# Patient Record
Sex: FEMALE | Race: WHITE | NOT HISPANIC OR LATINO | Employment: UNEMPLOYED | URBAN - METROPOLITAN AREA
[De-identification: names, ages, dates, MRNs, and addresses within clinical notes are randomized per-mention and may not be internally consistent; named-entity substitution may affect disease eponyms.]

---

## 2021-01-01 ENCOUNTER — HOSPITAL ENCOUNTER (EMERGENCY)
Facility: HOSPITAL | Age: 31
Discharge: HOME | End: 2021-01-01
Attending: EMERGENCY MEDICINE | Admitting: EMERGENCY MEDICINE
Payer: COMMERCIAL

## 2021-01-01 VITALS
RESPIRATION RATE: 18 BRPM | DIASTOLIC BLOOD PRESSURE: 83 MMHG | HEIGHT: 65 IN | TEMPERATURE: 98.8 F | SYSTOLIC BLOOD PRESSURE: 141 MMHG | WEIGHT: 131.17 LBS | HEART RATE: 122 BPM | BODY MASS INDEX: 21.85 KG/M2 | OXYGEN SATURATION: 97 %

## 2021-01-01 DIAGNOSIS — F10.20 ALCOHOLISM (HCC): ICD-10-CM

## 2021-01-01 DIAGNOSIS — F10.920 ALCOHOLIC INTOXICATION WITHOUT COMPLICATION (HCC): Primary | ICD-10-CM

## 2021-01-01 LAB
ALBUMIN SERPL BCP-MCNC: 3.7 G/DL (ref 3.5–5)
ALP SERPL-CCNC: 82 U/L (ref 46–116)
ALT SERPL W P-5'-P-CCNC: 25 U/L (ref 12–78)
AMPHETAMINES SERPL QL SCN: NEGATIVE
ANION GAP SERPL CALCULATED.3IONS-SCNC: 15 MMOL/L (ref 4–13)
AST SERPL W P-5'-P-CCNC: 43 U/L (ref 5–45)
BACTERIA UR QL AUTO: ABNORMAL /HPF
BARBITURATES UR QL: NEGATIVE
BASE EX.OXY STD BLDV CALC-SCNC: 96.8 % (ref 60–80)
BASE EXCESS BLDV CALC-SCNC: -6.2 MMOL/L
BASOPHILS # BLD AUTO: 0.02 THOUSANDS/ΜL (ref 0–0.1)
BASOPHILS NFR BLD AUTO: 0 % (ref 0–1)
BENZODIAZ UR QL: NEGATIVE
BILIRUB SERPL-MCNC: 0.43 MG/DL (ref 0.2–1)
BILIRUB UR QL STRIP: NEGATIVE
BUN SERPL-MCNC: 12 MG/DL (ref 5–25)
CALCIUM SERPL-MCNC: 8.4 MG/DL (ref 8.3–10.1)
CHLORIDE SERPL-SCNC: 98 MMOL/L (ref 100–108)
CLARITY UR: CLEAR
CO2 SERPL-SCNC: 19 MMOL/L (ref 21–32)
COCAINE UR QL: NEGATIVE
COLOR UR: YELLOW
CREAT SERPL-MCNC: 0.88 MG/DL (ref 0.6–1.3)
EOSINOPHIL # BLD AUTO: 0 THOUSAND/ΜL (ref 0–0.61)
EOSINOPHIL NFR BLD AUTO: 0 % (ref 0–6)
ERYTHROCYTE [DISTWIDTH] IN BLOOD BY AUTOMATED COUNT: 17.7 % (ref 11.6–15.1)
ETHANOL SERPL-MCNC: 244 MG/DL (ref 0–3)
GFR SERPL CREATININE-BSD FRML MDRD: 89 ML/MIN/1.73SQ M
GLUCOSE SERPL-MCNC: 111 MG/DL (ref 65–140)
GLUCOSE UR STRIP-MCNC: NEGATIVE MG/DL
HCG SERPL QL: NEGATIVE
HCO3 BLDV-SCNC: 16.4 MMOL/L (ref 24–30)
HCT VFR BLD AUTO: 39.4 % (ref 34.8–46.1)
HGB BLD-MCNC: 13 G/DL (ref 11.5–15.4)
HGB UR QL STRIP.AUTO: ABNORMAL
IMM GRANULOCYTES # BLD AUTO: 0.01 THOUSAND/UL (ref 0–0.2)
IMM GRANULOCYTES NFR BLD AUTO: 0 % (ref 0–2)
KETONES UR STRIP-MCNC: NEGATIVE MG/DL
LEUKOCYTE ESTERASE UR QL STRIP: NEGATIVE
LYMPHOCYTES # BLD AUTO: 0.62 THOUSANDS/ΜL (ref 0.6–4.47)
LYMPHOCYTES NFR BLD AUTO: 12 % (ref 14–44)
MAGNESIUM SERPL-MCNC: 1.9 MG/DL (ref 1.6–2.6)
MCH RBC QN AUTO: 29 PG (ref 26.8–34.3)
MCHC RBC AUTO-ENTMCNC: 33 G/DL (ref 31.4–37.4)
MCV RBC AUTO: 88 FL (ref 82–98)
METHADONE UR QL: NEGATIVE
MONOCYTES # BLD AUTO: 0.19 THOUSAND/ΜL (ref 0.17–1.22)
MONOCYTES NFR BLD AUTO: 4 % (ref 4–12)
NEUTROPHILS # BLD AUTO: 4.14 THOUSANDS/ΜL (ref 1.85–7.62)
NEUTS SEG NFR BLD AUTO: 84 % (ref 43–75)
NITRITE UR QL STRIP: NEGATIVE
NON-SQ EPI CELLS URNS QL MICRO: ABNORMAL /HPF
NRBC BLD AUTO-RTO: 0 /100 WBCS
O2 CT BLDV-SCNC: 18.6 ML/DL
OPIATES UR QL SCN: NEGATIVE
OXYCODONE+OXYMORPHONE UR QL SCN: NEGATIVE
PCO2 BLDV: 25.2 MM HG (ref 42–50)
PCP UR QL: NEGATIVE
PH BLDV: 7.43 [PH] (ref 7.3–7.4)
PH UR STRIP.AUTO: 6 [PH]
PLATELET # BLD AUTO: 162 THOUSANDS/UL (ref 149–390)
PMV BLD AUTO: 10.4 FL (ref 8.9–12.7)
PO2 BLDV: 168.1 MM HG (ref 35–45)
POTASSIUM SERPL-SCNC: 3.9 MMOL/L (ref 3.5–5.3)
PROT SERPL-MCNC: 8.1 G/DL (ref 6.4–8.2)
PROT UR STRIP-MCNC: ABNORMAL MG/DL
RBC # BLD AUTO: 4.48 MILLION/UL (ref 3.81–5.12)
RBC #/AREA URNS AUTO: ABNORMAL /HPF
SODIUM SERPL-SCNC: 132 MMOL/L (ref 136–145)
SP GR UR STRIP.AUTO: >=1.03 (ref 1–1.03)
THC UR QL: POSITIVE
UROBILINOGEN UR QL STRIP.AUTO: 0.2 E.U./DL
WBC # BLD AUTO: 4.98 THOUSAND/UL (ref 4.31–10.16)
WBC #/AREA URNS AUTO: ABNORMAL /HPF

## 2021-01-01 PROCEDURE — 99285 EMERGENCY DEPT VISIT HI MDM: CPT | Performed by: EMERGENCY MEDICINE

## 2021-01-01 PROCEDURE — 96365 THER/PROPH/DIAG IV INF INIT: CPT

## 2021-01-01 PROCEDURE — 84703 CHORIONIC GONADOTROPIN ASSAY: CPT | Performed by: EMERGENCY MEDICINE

## 2021-01-01 PROCEDURE — 83735 ASSAY OF MAGNESIUM: CPT | Performed by: EMERGENCY MEDICINE

## 2021-01-01 PROCEDURE — 85025 COMPLETE CBC W/AUTO DIFF WBC: CPT | Performed by: EMERGENCY MEDICINE

## 2021-01-01 PROCEDURE — 96375 TX/PRO/DX INJ NEW DRUG ADDON: CPT

## 2021-01-01 PROCEDURE — 80053 COMPREHEN METABOLIC PANEL: CPT | Performed by: EMERGENCY MEDICINE

## 2021-01-01 PROCEDURE — 96361 HYDRATE IV INFUSION ADD-ON: CPT

## 2021-01-01 PROCEDURE — 36415 COLL VENOUS BLD VENIPUNCTURE: CPT | Performed by: EMERGENCY MEDICINE

## 2021-01-01 PROCEDURE — 99285 EMERGENCY DEPT VISIT HI MDM: CPT

## 2021-01-01 PROCEDURE — 82805 BLOOD GASES W/O2 SATURATION: CPT | Performed by: EMERGENCY MEDICINE

## 2021-01-01 PROCEDURE — 82077 ASSAY SPEC XCP UR&BREATH IA: CPT | Performed by: EMERGENCY MEDICINE

## 2021-01-01 PROCEDURE — 80307 DRUG TEST PRSMV CHEM ANLYZR: CPT | Performed by: EMERGENCY MEDICINE

## 2021-01-01 PROCEDURE — 96376 TX/PRO/DX INJ SAME DRUG ADON: CPT

## 2021-01-01 PROCEDURE — 81001 URINALYSIS AUTO W/SCOPE: CPT | Performed by: EMERGENCY MEDICINE

## 2021-01-01 PROCEDURE — 96366 THER/PROPH/DIAG IV INF ADDON: CPT

## 2021-01-01 PROCEDURE — 93005 ELECTROCARDIOGRAM TRACING: CPT

## 2021-01-01 RX ORDER — CHLORDIAZEPOXIDE HYDROCHLORIDE 25 MG/1
25 CAPSULE, GELATIN COATED ORAL 3 TIMES DAILY PRN
Qty: 20 CAPSULE | Refills: 0 | Status: SHIPPED | OUTPATIENT
Start: 2021-01-01 | End: 2021-01-03 | Stop reason: HOSPADM

## 2021-01-01 RX ORDER — ONDANSETRON 2 MG/ML
4 INJECTION INTRAMUSCULAR; INTRAVENOUS ONCE
Status: COMPLETED | OUTPATIENT
Start: 2021-01-01 | End: 2021-01-01

## 2021-01-01 RX ORDER — SODIUM CHLORIDE, SODIUM LACTATE, POTASSIUM CHLORIDE, CALCIUM CHLORIDE 600; 310; 30; 20 MG/100ML; MG/100ML; MG/100ML; MG/100ML
1000 INJECTION, SOLUTION INTRAVENOUS CONTINUOUS
Status: DISCONTINUED | OUTPATIENT
Start: 2021-01-01 | End: 2021-01-01 | Stop reason: HOSPADM

## 2021-01-01 RX ORDER — DIAZEPAM 5 MG/ML
10 INJECTION, SOLUTION INTRAMUSCULAR; INTRAVENOUS ONCE
Status: COMPLETED | OUTPATIENT
Start: 2021-01-01 | End: 2021-01-01

## 2021-01-01 RX ORDER — MAGNESIUM SULFATE HEPTAHYDRATE 40 MG/ML
2 INJECTION, SOLUTION INTRAVENOUS ONCE
Status: COMPLETED | OUTPATIENT
Start: 2021-01-01 | End: 2021-01-01

## 2021-01-01 RX ORDER — ONDANSETRON 4 MG/1
8 TABLET, ORALLY DISINTEGRATING ORAL EVERY 8 HOURS PRN
Qty: 20 TABLET | Refills: 3 | Status: SHIPPED | OUTPATIENT
Start: 2021-01-01

## 2021-01-01 RX ORDER — BUSPIRONE HYDROCHLORIDE 15 MG/1
15 TABLET ORAL 2 TIMES DAILY
COMMUNITY

## 2021-01-01 RX ORDER — LORAZEPAM 2 MG/ML
1 INJECTION INTRAMUSCULAR ONCE
Status: COMPLETED | OUTPATIENT
Start: 2021-01-01 | End: 2021-01-01

## 2021-01-01 RX ADMIN — MAGNESIUM SULFATE HEPTAHYDRATE 2 G: 40 INJECTION, SOLUTION INTRAVENOUS at 15:34

## 2021-01-01 RX ADMIN — LORAZEPAM 1 MG: 2 INJECTION INTRAMUSCULAR; INTRAVENOUS at 18:06

## 2021-01-01 RX ADMIN — DIAZEPAM 10 MG: 10 INJECTION, SOLUTION INTRAMUSCULAR; INTRAVENOUS at 15:33

## 2021-01-01 RX ADMIN — ONDANSETRON 4 MG: 2 INJECTION INTRAMUSCULAR; INTRAVENOUS at 15:31

## 2021-01-01 RX ADMIN — SODIUM CHLORIDE, SODIUM LACTATE, POTASSIUM CHLORIDE, AND CALCIUM CHLORIDE 1000 ML: .6; .31; .03; .02 INJECTION, SOLUTION INTRAVENOUS at 17:17

## 2021-01-01 RX ADMIN — DIAZEPAM 10 MG: 10 INJECTION, SOLUTION INTRAMUSCULAR; INTRAVENOUS at 16:19

## 2021-01-01 NOTE — DISCHARGE INSTRUCTIONS
Diagnosis; alcoholism/ acute alcohol intoxication     -  naltrexone-- - 1 tablet once a day - helps prevent alcohol cravings     - please keep hydrated- and eat food     -  for nausea/ vomiting - zofran -  2 tablets dissolve in the mouth  every 6-8 hrs as needed     - for any signs of withdrawal- shakes-- librium 1-2 tablets every 6-8 hrs as needed     - please return to  the er for any worsening alcohol withdrawal symptoms- any seizure activity -- any intractable vomiting with the inability to keep anything down by mouth /any increasing bloody/coffee ground vomitus/ any black tarry stools-

## 2021-01-01 NOTE — ED NOTES
Per Dr Robbie Snyder, patient can be discharged after receiving liter of fluids     [facility], RN  01/01/21 6866

## 2021-01-01 NOTE — ED NOTES
Patient states that she is not feeling well  MD made aware  Orders placed   Patient has 500 ml of fluids remaining     [facility]  01/01/21 8405

## 2021-01-01 NOTE — ED PROCEDURE NOTE
PROCEDURE  ECG 12 Lead Documentation Only    Date/Time: 1/1/2021 4:11 PM  Performed by: Deniz Montalvo MD  Authorized by: Deniz Montalvo MD     Indications / Diagnosis:  TACHY  ECG reviewed by me, the ED Provider: yes    Patient location:  ED and bedside  Previous ECG:     Previous ECG:  Unavailable    Comparison to cardiac monitor: Yes    Interpretation:     Interpretation: non-specific    Rate:     ECG rate:  109    ECG rate assessment: tachycardic    Rhythm:     Rhythm: sinus tachycardia    Ectopy:     Ectopy: PVCs      PVCs:  Infrequent and unifocal  QRS:     QRS axis:  Normal    QRS intervals:  Normal  Conduction:     Conduction: abnormal      Abnormal conduction comment:  SHORT [place]   ST segments:     ST segments:  Normal  T waves:     T waves: flattening and inverted      Flattening:  AVL and V2    Inverted:  V1  Other findings:     Other findings: U wave    Comments:      NO ECG SIGNS OF ISCHEMIA/ INJURY / R HEART STRAIN / Gwendolyn Rowe MD  01/01/21 6994

## 2021-01-02 ENCOUNTER — APPOINTMENT (EMERGENCY)
Dept: RADIOLOGY | Facility: HOSPITAL | Age: 31
End: 2021-01-02
Payer: COMMERCIAL

## 2021-01-02 ENCOUNTER — HOSPITAL ENCOUNTER (OUTPATIENT)
Facility: HOSPITAL | Age: 31
Setting detail: OBSERVATION
Discharge: HOME | End: 2021-01-03
Attending: EMERGENCY MEDICINE | Admitting: INTERNAL MEDICINE
Payer: COMMERCIAL
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
## 2021-01-02 DIAGNOSIS — Z72.89 ALCOHOL USE: ICD-10-CM

## 2021-01-02 PROBLEM — F10.929 ALCOHOL INTOXICATION (HCC): Status: ACTIVE | Noted: 2021-01-02

## 2021-01-02 PROBLEM — F41.9 ANXIETY: Status: ACTIVE | Noted: 2021-01-02

## 2021-01-02 LAB
AMPHETAMINES SERPL QL SCN: NEGATIVE
ATRIAL RATE: 114 BPM
BACTERIA UR QL AUTO: NORMAL /HPF
BARBITURATES UR QL: NEGATIVE
BENZODIAZ UR QL: NEGATIVE
BILIRUB UR QL STRIP: NEGATIVE
CLARITY UR: CLEAR
COCAINE UR QL: NEGATIVE
COLOR UR: YELLOW
COLOR, POC: YELLOW
ETHANOL EXG-MCNC: 0.35 MG/DL
EXT PREG TEST URINE: NEGATIVE
EXT. CONTROL ED NAV: NORMAL
GLUCOSE UR STRIP-MCNC: NEGATIVE MG/DL
HGB UR QL STRIP.AUTO: ABNORMAL
KETONES UR STRIP-MCNC: NEGATIVE MG/DL
LEUKOCYTE ESTERASE UR QL STRIP: NEGATIVE
METHADONE UR QL: NEGATIVE
NITRITE UR QL STRIP: NEGATIVE
NON-SQ EPI CELLS URNS QL MICRO: NORMAL /HPF
OPIATES UR QL SCN: NEGATIVE
OXYCODONE+OXYMORPHONE UR QL SCN: NEGATIVE
P AXIS: 69 DEGREES
PCP UR QL: NEGATIVE
PH UR STRIP.AUTO: 6.5 [PH] (ref 4.5–8)
PR INTERVAL: 118 MS
PROT UR STRIP-MCNC: NEGATIVE MG/DL
QRS AXIS: 36 DEGREES
QRSD INTERVAL: 70 MS
QT INTERVAL: 328 MS
QTC INTERVAL: 442 MS
RBC #/AREA URNS AUTO: NORMAL /HPF
SP GR UR STRIP.AUTO: 1.01 (ref 1–1.03)
T WAVE AXIS: 65 DEGREES
THC UR QL: POSITIVE
UROBILINOGEN UR QL STRIP.AUTO: 0.2 E.U./DL
VENTRICULAR RATE: 109 BPM
WBC #/AREA URNS AUTO: NORMAL /HPF

## 2021-01-02 PROCEDURE — 93010 ELECTROCARDIOGRAM REPORT: CPT | Performed by: INTERNAL MEDICINE

## 2021-01-02 PROCEDURE — 82075 ASSAY OF BREATH ETHANOL: CPT | Performed by: EMERGENCY MEDICINE

## 2021-01-02 PROCEDURE — 81025 URINE PREGNANCY TEST: CPT | Performed by: EMERGENCY MEDICINE

## 2021-01-02 PROCEDURE — 70450 CT HEAD/BRAIN W/O DYE: CPT

## 2021-01-02 PROCEDURE — 99285 EMERGENCY DEPT VISIT HI MDM: CPT

## 2021-01-02 PROCEDURE — 99285 EMERGENCY DEPT VISIT HI MDM: CPT | Performed by: EMERGENCY MEDICINE

## 2021-01-02 PROCEDURE — 80307 DRUG TEST PRSMV CHEM ANLYZR: CPT | Performed by: EMERGENCY MEDICINE

## 2021-01-02 PROCEDURE — 81001 URINALYSIS AUTO W/SCOPE: CPT

## 2021-01-02 PROCEDURE — G1004 CDSM NDSC: HCPCS

## 2021-01-02 RX ORDER — LANOLIN ALCOHOL/MO/W.PET/CERES
6 CREAM (GRAM) TOPICAL
Status: DISCONTINUED | OUTPATIENT
Start: 2021-01-02 | End: 2021-01-03 | Stop reason: HOSPADM

## 2021-01-02 RX ORDER — THIAMINE MONONITRATE (VIT B1) 100 MG
100 TABLET ORAL DAILY
Status: DISCONTINUED | OUTPATIENT
Start: 2021-01-02 | End: 2021-01-03 | Stop reason: HOSPADM

## 2021-01-02 RX ORDER — LANOLIN ALCOHOL/MO/W.PET/CERES
400 CREAM (GRAM) TOPICAL DAILY
Status: DISCONTINUED | OUTPATIENT
Start: 2021-01-02 | End: 2021-01-03 | Stop reason: HOSPADM

## 2021-01-02 RX ORDER — LORAZEPAM 1 MG/1
2 TABLET ORAL ONCE
Status: COMPLETED | OUTPATIENT
Start: 2021-01-02 | End: 2021-01-02

## 2021-01-02 RX ORDER — SODIUM CHLORIDE, SODIUM GLUCONATE, SODIUM ACETATE, POTASSIUM CHLORIDE, MAGNESIUM CHLORIDE, SODIUM PHOSPHATE, DIBASIC, AND POTASSIUM PHOSPHATE .53; .5; .37; .037; .03; .012; .00082 G/100ML; G/100ML; G/100ML; G/100ML; G/100ML; G/100ML; G/100ML
125 INJECTION, SOLUTION INTRAVENOUS CONTINUOUS
Status: DISCONTINUED | OUTPATIENT
Start: 2021-01-02 | End: 2021-01-03 | Stop reason: HOSPADM

## 2021-01-02 RX ORDER — ONDANSETRON 2 MG/ML
4 INJECTION INTRAMUSCULAR; INTRAVENOUS EVERY 6 HOURS PRN
Status: DISCONTINUED | OUTPATIENT
Start: 2021-01-02 | End: 2021-01-03 | Stop reason: HOSPADM

## 2021-01-02 RX ADMIN — MELATONIN 6 MG: at 22:51

## 2021-01-02 RX ADMIN — SODIUM CHLORIDE, SODIUM GLUCONATE, SODIUM ACETATE, POTASSIUM CHLORIDE, MAGNESIUM CHLORIDE, SODIUM PHOSPHATE, DIBASIC, AND POTASSIUM PHOSPHATE 125 ML/HR: .53; .5; .37; .037; .03; .012; .00082 INJECTION, SOLUTION INTRAVENOUS at 23:03

## 2021-01-02 RX ADMIN — BUSPIRONE HYDROCHLORIDE 15 MG: 10 TABLET ORAL at 22:31

## 2021-01-02 RX ADMIN — LORAZEPAM 2 MG: 1 TABLET ORAL at 22:51

## 2021-01-02 RX ADMIN — ONDANSETRON 4 MG: 2 INJECTION INTRAMUSCULAR; INTRAVENOUS at 22:51
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
## 2021-01-03 VITALS
OXYGEN SATURATION: 95 % | RESPIRATION RATE: 18 BRPM | WEIGHT: 131 LBS | HEIGHT: 65 IN | BODY MASS INDEX: 21.83 KG/M2 | SYSTOLIC BLOOD PRESSURE: 137 MMHG | DIASTOLIC BLOOD PRESSURE: 85 MMHG | HEART RATE: 73 BPM | TEMPERATURE: 98.4 F

## 2021-01-03 LAB
ALBUMIN SERPL BCP-MCNC: 3.6 G/DL (ref 3.5–5)
ALP SERPL-CCNC: 76 U/L (ref 46–116)
ALT SERPL W P-5'-P-CCNC: 24 U/L (ref 12–78)
ANION GAP SERPL CALCULATED.3IONS-SCNC: 6 MMOL/L (ref 4–13)
AST SERPL W P-5'-P-CCNC: 52 U/L (ref 5–45)
BASOPHILS # BLD AUTO: 0.01 THOUSANDS/ΜL (ref 0–0.1)
BASOPHILS NFR BLD AUTO: 0 % (ref 0–1)
BILIRUB SERPL-MCNC: 0.84 MG/DL (ref 0.2–1)
BUN SERPL-MCNC: 7 MG/DL (ref 5–25)
CALCIUM SERPL-MCNC: 8.5 MG/DL (ref 8.3–10.1)
CHLORIDE SERPL-SCNC: 103 MMOL/L (ref 100–108)
CO2 SERPL-SCNC: 26 MMOL/L (ref 21–32)
CREAT SERPL-MCNC: 0.67 MG/DL (ref 0.6–1.3)
EOSINOPHIL # BLD AUTO: 0.01 THOUSAND/ΜL (ref 0–0.61)
EOSINOPHIL NFR BLD AUTO: 0 % (ref 0–6)
ERYTHROCYTE [DISTWIDTH] IN BLOOD BY AUTOMATED COUNT: 17.5 % (ref 11.6–15.1)
GFR SERPL CREATININE-BSD FRML MDRD: 119 ML/MIN/1.73SQ M
GLUCOSE P FAST SERPL-MCNC: 88 MG/DL (ref 65–99)
GLUCOSE SERPL-MCNC: 88 MG/DL (ref 65–140)
HCT VFR BLD AUTO: 34.7 % (ref 34.8–46.1)
HGB BLD-MCNC: 11 G/DL (ref 11.5–15.4)
IMM GRANULOCYTES # BLD AUTO: 0.01 THOUSAND/UL (ref 0–0.2)
IMM GRANULOCYTES NFR BLD AUTO: 0 % (ref 0–2)
LYMPHOCYTES # BLD AUTO: 1.29 THOUSANDS/ΜL (ref 0.6–4.47)
LYMPHOCYTES NFR BLD AUTO: 43 % (ref 14–44)
MCH RBC QN AUTO: 28.9 PG (ref 26.8–34.3)
MCHC RBC AUTO-ENTMCNC: 31.7 G/DL (ref 31.4–37.4)
MCV RBC AUTO: 91 FL (ref 82–98)
MONOCYTES # BLD AUTO: 0.21 THOUSAND/ΜL (ref 0.17–1.22)
MONOCYTES NFR BLD AUTO: 7 % (ref 4–12)
NEUTROPHILS # BLD AUTO: 1.44 THOUSANDS/ΜL (ref 1.85–7.62)
NEUTS SEG NFR BLD AUTO: 50 % (ref 43–75)
NRBC BLD AUTO-RTO: 0 /100 WBCS
PLATELET # BLD AUTO: 92 THOUSANDS/UL (ref 149–390)
PMV BLD AUTO: 12 FL (ref 8.9–12.7)
POTASSIUM SERPL-SCNC: 3.6 MMOL/L (ref 3.5–5.3)
PROT SERPL-MCNC: 7.3 G/DL (ref 6.4–8.2)
RBC # BLD AUTO: 3.81 MILLION/UL (ref 3.81–5.12)
SODIUM SERPL-SCNC: 135 MMOL/L (ref 136–145)
WBC # BLD AUTO: 2.97 THOUSAND/UL (ref 4.31–10.16)

## 2021-01-03 PROCEDURE — 80053 COMPREHEN METABOLIC PANEL: CPT | Performed by: STUDENT IN AN ORGANIZED HEALTH CARE EDUCATION/TRAINING PROGRAM

## 2021-01-03 PROCEDURE — 85025 COMPLETE CBC W/AUTO DIFF WBC: CPT | Performed by: STUDENT IN AN ORGANIZED HEALTH CARE EDUCATION/TRAINING PROGRAM

## 2021-01-03 PROCEDURE — NC001 PR NO CHARGE: Performed by: INTERNAL MEDICINE

## 2021-01-03 PROCEDURE — 99234 HOSP IP/OBS SM DT SF/LOW 45: CPT | Performed by: INTERNAL MEDICINE

## 2021-01-03 RX ORDER — CHLORDIAZEPOXIDE HYDROCHLORIDE 25 MG/1
25 CAPSULE, GELATIN COATED ORAL ONCE
Status: COMPLETED | OUTPATIENT
Start: 2021-01-03 | End: 2021-01-03

## 2021-01-03 RX ORDER — LORAZEPAM 1 MG/1
2 TABLET ORAL ONCE
Status: COMPLETED | OUTPATIENT
Start: 2021-01-03 | End: 2021-01-03

## 2021-01-03 RX ORDER — CHLORDIAZEPOXIDE HYDROCHLORIDE 25 MG/1
25 CAPSULE, GELATIN COATED ORAL 3 TIMES DAILY PRN
Refills: 0 | Status: CANCELLED | OUTPATIENT
Start: 2021-01-03 | End: 2021-01-13

## 2021-01-03 RX ADMIN — CHLORDIAZEPOXIDE HYDROCHLORIDE 25 MG: 25 CAPSULE ORAL at 10:45

## 2021-01-03 RX ADMIN — THIAMINE HCL TAB 100 MG 100 MG: 100 TAB at 09:33

## 2021-01-03 RX ADMIN — LORAZEPAM 2 MG: 1 TABLET ORAL at 09:32

## 2021-01-03 RX ADMIN — LORAZEPAM 2 MG: 1 TABLET ORAL at 04:12

## 2021-01-03 RX ADMIN — FOLIC ACID TAB 400 MCG 400 MCG: 400 TAB at 09:33

## 2021-01-03 RX ADMIN — THIAMINE HCL TAB 100 MG 100 MG: 100 TAB at 00:01

## 2021-01-03 RX ADMIN — BUSPIRONE HYDROCHLORIDE 15 MG: 10 TABLET ORAL at 09:33

## 2021-01-03 RX ADMIN — SODIUM CHLORIDE, SODIUM GLUCONATE, SODIUM ACETATE, POTASSIUM CHLORIDE, MAGNESIUM CHLORIDE, SODIUM PHOSPHATE, DIBASIC, AND POTASSIUM PHOSPHATE 125 ML/HR: .53; .5; .37; .037; .03; .012; .00082 INJECTION, SOLUTION INTRAVENOUS at 05:52

## 2021-01-03 NOTE — DISCHARGE SUMMARY
INTERNAL MEDICINE RESIDENCY DISCHARGE SUMMARY     Nichelle Ackerman   35 y o  female  MRN: [id number]  Room/Bed: [facility] 91/[facility]     [address] [address]   Encounter: [id number]    Principal Problem:    Alcohol intoxication [facility])  Active Problems:    Anxiety      Anxiety  Assessment & Plan  Continue home buspar 15mg BID  Melatonin 6mg qHS for insomnia    * Alcohol intoxication [facility])  Assessment & Plan  Patient has history of alcohol withdrawal seizures  Presented to ED today after a fall and blood alcohol level found to be 0 354  Patient alert and oriented x4  CT head negative for acute intracranial abnormalities  Patient appears somewhat anxious, tachycardic to 120, tremors  Patient is prescribed Librium outpatient for withdrawals  Patient has plane ticket to return to [place] tomorrow and would like to go back to rehab there  No Si/HI/visual or auditory hallucinations    Plan:  - CIWA protocol   - isolyte 125 mL/hr  - thiamine, folate  - Zofran PRN for nausea  - regular diet   - continue to monitor hemodynamic status         [address]     Per H&P 1/3/2020:  "Patient is a 55-year-old female past medical history significant for alcohol abuse, anxiety  Patient presented to ED status post fall in the setting of alcohol intoxication  Reportedly patient was found at the bottom of the steps  Patient able to recall details of her fall  Patient has extensive history of alcohol abuse and status post rehab in [place]  Patient reports severe life stressors that allegedly culture to start drinking again  A time of evaluation, patient noted to be tremulous and reported palpitations  Denies hallucinations, SI, HI  Vitals notable for tachycardia in the 110s and documented respiratory rate in 20s  Blood pressure within normal limits  Room air  Laboratory evaluation notable for sodium 130 to  Normal kidney function markers  Elevated LUBNA at 244  UDS positive for THC  CT head   Patient admitted for alcohol intoxication and at risk of alcohol withdrawal on CIWA protocol "    Overnight, she was tachycardic and agitated requiring prn ativan x 2  She remained hemodynamically stable  CIWA score 2 this morning  CBC: WBC 2 97  CMP Na 135  She has a flight back to [place] at 3 pm and will go back to the rehab center  She is comfortable going home with Librium and follow up at the rehab center  She was advised to seek medical care if she feels symptoms of withdrawal including worsening agitations, elevated bp, confusion, tremors and seizures  The patient was instructed to follow-up with PCP within 1 week  The patient was counseled on the importance of taking all medications as prescribed  Strict return instructions given  The patient verbalized understanding of hospital course and importance of making all follow-up appointments  All questions answered  Subjective:     Patient seen and examined at bedside  She endorses agitation for which she did require Ativan  She still feels anxious  She denies any hallucinations, chest pain, shortness of breath, nausea, vomiting, fever-chills  She has a flight at 3:00 p m  And would like to be discharged     Objective:    Vitals:    01/03/21 1007   BP: 137/85   Pulse: 73   Resp:    Temp: 98 4 °F (36 9 °C)   SpO2: 95%        Physical Exam  Constitutional:       General: She is not in acute distress  Appearance: She is normal weight  She is not ill-appearing, toxic-appearing or diaphoretic  HENT:      Head: Normocephalic and atraumatic  Cardiovascular:      Rate and Rhythm: Normal rate and regular rhythm  Pulses: Normal pulses  Heart sounds: Normal heart sounds  No murmur  No friction rub  No gallop  Pulmonary:      Effort: Pulmonary effort is normal  No respiratory distress  Breath sounds: Normal breath sounds  No stridor  No wheezing, rhonchi or rales     Chest:      Chest wall: No tenderness  Abdominal:      General: Abdomen is flat  Bowel sounds are normal  There is no distension  Palpations: Abdomen is soft  There is no mass  Tenderness: There is no abdominal tenderness  There is no right CVA tenderness, left CVA tenderness, guarding or rebound  Hernia: No hernia is present  Musculoskeletal:      Right lower leg: No edema  Left lower leg: No edema  Neurological:      General: No focal deficit present  Mental Status: She is alert and oriented to person, place, and time  Mental status is at baseline  Psychiatric:         Mood and Affect: Mood normal          Behavior: Behavior normal               DISCHARGE INFORMATION     PCP at Discharge: N/A  Admitting Provider: Reggie Tomas MD  Admission Date: 1/2/2021    Discharge Provider: Reggie Tomas MD  Discharge Date: 1/3/2021    Discharge Disposition: Home/Self Care  Discharge Condition: good  Discharge with Lines: no    Discharge Diet: regular diet  Activity Restrictions: as tolerated  Test Results Pending at Discharge: beto    Discharge Diagnoses:  Principal Problem:    Alcohol intoxication ([address] )  Active Problems:    Anxiety  Resolved Problems:    * No resolved hospital problems  *      Consulting Providers:      Diagnostic & Therapeutic Procedures Performed:  Ct Head Without Contrast    Result Date: 1/2/2021  Impression: No acute intracranial abnormality   Workstation performed: [id number]       Code Status: Level 1 - Full Code  Advance Directive & Living Will: <no information>  Power of :    POLST:      Medications:  Current Discharge Medication List        Current Discharge Medication List        Current Discharge Medication List      CONTINUE these medications which have NOT CHANGED    Details   busPIRone (BUSPAR) 15 mg tablet Take 15 mg by mouth 2 (two) times a day      chlordiazePOXIDE (LIBRIUM) 25 mg capsule Take 1 capsule (25 mg total) by mouth 3 (three) times a day as needed for anxiety for up to 20 doses  Qty: 20 capsule, Refills: 0    Associated Diagnoses: Alcoholic intoxication without complication (HCC)      ondansetron (Zofran ODT) 4 mg disintegrating tablet Take 2 tablets (8 mg total) by mouth every 8 (eight) hours as needed for vomiting for up to 20 doses  Qty: 20 tablet, Refills: 3    Associated Diagnoses: Alcoholic intoxication without complication (HCC)             Allergies: Allergies   Allergen Reactions    Lamictal [Lamotrigine] Blisters       FOLLOW-UP     PCP Outpatient Follow-up:  yes      Follow up: PCP  Date and time: 1 week    Consulting Providers Follow-up:  none required     Active Issues Requiring Follow-up:   none    Discharge Statement:   I spent 30 minutes minutes discharging the patient  This time was spent on the day of discharge  I had direct contact with the patient on the day of discharge  Additional documentation is required if more than 30 minutes were spent on discharge  Portions of the record may have been created with voice recognition software  Occasional wrong word or "sound a like" substitutions may have occurred due to the inherent limitations of voice recognition software    Read the chart carefully and recognize, using context, where substitutions have occurred     ==  Anna Jones MD  [address]  Internal Medicine Resident PGY-1

## 2021-01-03 NOTE — ASSESSMENT & PLAN NOTE
Patient has history of alcohol withdrawal seizures  Presented to ED today after a fall and blood alcohol level found to be 0 354  Patient alert and oriented x4  CT head negative for acute intracranial abnormalities  Patient appears somewhat anxious, tachycardic to 120, tremors  Patient is prescribed Librium outpatient for withdrawals  Patient has plane ticket to return to [place] tomorrow and would like to go back to rehab there       No Si/HI/visual or auditory hallucinations    Plan:  - CIWA protocol   - isolyte 125 mL/hr  - thiamine, folate  - Zofran PRN for nausea  - regular diet   - continue to monitor hemodynamic status

## 2021-01-03 NOTE — H&P
INTERNAL MEDICINE RESIDENCY ADMISSION H&P     Name: Juan David Payan   Age & Sex: 35 y o  female   MRN: [id number]  Unit/Bed#: [facility] 919-01   Encounter: [id number]  Primary Care Provider: No primary care provider on file  Code Status: Level 1 - Full Code  Admission Status: INPATIENT   Disposition: Patient requires Med/Surg    Admit to team: SOD Team A    ASSESSMENT/PLAN     Principal Problem:    Alcohol intoxication ([facility] 75 )  Active Problems:    Anxiety      * Alcohol intoxication ([facility] )  Assessment & Plan  Patient has history of alcohol withdrawal seizures  Presented to ED today after a fall and blood alcohol level found to be 0 354  Patient alert and oriented x4  CT head negative for acute intracranial abnormalities  Patient appears somewhat anxious, tachycardic to 120, tremors  Patient is prescribed Librium outpatient for withdrawals  Patient has plane ticket to return to [place] tomorrow and would like to go back to rehab there  No Si/HI/visual or auditory hallucinations    Plan:  - CIWA protocol   - isolyte 125 mL/hr  - thiamine, folate  - Zofran PRN for nausea  - regular diet   - continue to monitor hemodynamic status     Anxiety  Assessment & Plan  Continue home buspar 15mg BID  Melatonin 6mg qHS for insomnia      VTE Pharmacologic Prophylaxis: None indicated  VTE Mechanical Prophylaxis: Ambulate    CHIEF COMPLAINT     Chief Complaint   Patient presents with    Fall     per ems, pt found by police at bottom of stairs unconscious, +etoh, pt a/ox4 during triage, reports depression, denies SI/HI/AH/VH      HISTORY OF PRESENT ILLNESS     Patient is a 61-year-old female with history of alcohol abuse and anxiety who presented to the ED with alcohol intoxication after a fall  Per EMS, patient was found at the bottom of the steps and does not remember that she fell  She was previously in rehab for about a month and [place] and relapsed around mid December after coming to [place] for the holidays  Patient states that the past week she is drink about a handle of liquor a day and today drink a small box of wine  Patient states that her boyfriend is currently in the ICU due to a pretty severe accident which caused her to start drinking again  Prior to rehab, patient states that she drank about a 5th of liquor per day  She does have a history of alcohol withdrawal seizures which were previously treated inpatient  She does not currently have any visual or auditory hallucinations  Currently patient is only experiencing tremors and some palpitations but is otherwise denies fevers, chills, chest pain, shortness of breath, nausea, vomiting, abdominal pain  Denies SI/HI  She has a plane ticket to return to [place] tomorrow and she was planning on going back to rehab  Patient does not smoke cigarettes, uses THC recreationally  No other recreational drug use  In the ED, patient was alert and oriented x4, CBC unremarkable, CMP with sodium 132, CO2 19, AST/ALT within normal limits  Ethanol level 0 354, UDS positive only for THC  Urine positive for moderate blood and no WBC or bacteria  REVIEW OF SYSTEMS     Review of Systems   Constitutional: Negative  HENT: Negative  Eyes: Negative  Respiratory: Negative  Cardiovascular: Positive for palpitations  Gastrointestinal: Negative for diarrhea, nausea and vomiting  Endocrine: Negative  Genitourinary: Negative  Musculoskeletal: Negative  Skin: Negative  Allergic/Immunologic: Negative  Neurological: Positive for tremors  Hematological: Negative  Psychiatric/Behavioral: Negative for self-injury and suicidal ideas  The patient is nervous/anxious        OBJECTIVE     Vitals:    01/02/21 1508 01/02/21 1747 01/02/21 1952   BP: 130/91 119/81 122/81   BP Location: Right arm  Left arm   Pulse: (!) 119 104 95   Resp: 18 16 (!) 24   Temp: 97 6 °F (36 4 °C) 99 1 °F (37 3 °C) 100 °F (37 8 °C)   TempSrc: Oral  Oral   SpO2: 98% 97% 95% Weight: 59 4 kg (131 lb) 59 4 kg (131 lb)    Height: 5' 5" (1 651 m) 5' 5" (1 651 m)       Temperature:   Temp (24hrs), Av 9 °F (37 2 °C), Min:97 6 °F (36 4 °C), Max:100 °F (37 8 °C)    Temperature: 100 °F (37 8 °C)  Intake & Output:  I/O        07 -  0700  07 -  0700          Unmeasured Urine Occurrence  1 x        Weights:   IBW: 57 kg    Body mass index is 21 8 kg/m²  Weight (last 2 days)     Date/Time   Weight    21 17:47:41   59 4 (131)    21 1508   59 4 (131)            Physical Exam  Constitutional:       Comments: anxious   HENT:      Head: Normocephalic and atraumatic  Mouth/Throat:      Mouth: Mucous membranes are moist       Pharynx: No oropharyngeal exudate or posterior oropharyngeal erythema  Eyes:      General: No scleral icterus  Extraocular Movements: Extraocular movements intact  Pupils: Pupils are equal, round, and reactive to light  Cardiovascular:      Rate and Rhythm: Regular rhythm  Tachycardia present  Heart sounds: No murmur  No friction rub  No gallop  Pulmonary:      Effort: Pulmonary effort is normal  No respiratory distress  Breath sounds: Normal breath sounds  No wheezing or rales  Abdominal:      General: There is no distension  Palpations: Abdomen is soft  Tenderness: There is no abdominal tenderness  Musculoskeletal: Normal range of motion  Right lower leg: No edema  Left lower leg: No edema  Skin:     General: Skin is warm and dry  Neurological:      General: No focal deficit present  Mental Status: She is alert and oriented to person, place, and time  Cranial Nerves: No cranial nerve deficit  Motor: No weakness     Psychiatric:         Mood and Affect: Mood normal          Behavior: Behavior normal        PAST MEDICAL HISTORY     Past Medical History:   Diagnosis Date    Alcohol abuse      PAST SURGICAL HISTORY     Past Surgical History:   Procedure Laterality Date  BUNIONECTOMY       SOCIAL & FAMILY HISTORY     Social History     Substance and Sexual Activity   Alcohol Use Yes    Frequency: 4 or more times a week     Social History     Substance and Sexual Activity   Drug Use Yes    Types: Marijuana     Social History     Tobacco Use   Smoking Status Never Smoker   Smokeless Tobacco Never Used     History reviewed  No pertinent family history  LABORATORY DATA     Labs: I have personally reviewed pertinent reports  Results from last 7 days   Lab Units 01/01/21  1513   WBC Thousand/uL 4 98   HEMOGLOBIN g/dL 13 0   HEMATOCRIT % 39 4   PLATELETS Thousands/uL 162   NEUTROS PCT % 84*   MONOS PCT % 4      Results from last 7 days   Lab Units 01/01/21  1513   POTASSIUM mmol/L 3 9   CHLORIDE mmol/L 98*   CO2 mmol/L 19*   BUN mg/dL 12   CREATININE mg/dL 0 88   CALCIUM mg/dL 8 4   ALK PHOS U/L 82   ALT U/L 25   AST U/L 43     Results from last 7 days   Lab Units 01/01/21  1513   MAGNESIUM mg/dL 1 9                      Micro:  No results found for: BLOODCX, [employer], WOUNDCULT, SPUTUMCULTUR  IMAGING & DIAGNOSTIC TESTS     Imaging: I have personally reviewed pertinent reports  Ct Head Without Contrast    Result Date: 1/2/2021  Impression: No acute intracranial abnormality  Workstation performed: [id number]     EKG, Pathology, and Other Studies: I have personally reviewed pertinent reports  ALLERGIES     Allergies   Allergen Reactions    Lamictal [Lamotrigine] Blisters     MEDICATIONS PRIOR TO ARRIVAL     Prior to Admission medications    Medication Sig Start Date End Date Taking?  Authorizing Provider   busPIRone (BUSPAR) 15 mg tablet Take 15 mg by mouth 2 (two) times a day   Yes Historical Provider, MD   chlordiazePOXIDE (LIBRIUM) 25 mg capsule Take 1 capsule (25 mg total) by mouth 3 (three) times a day as needed for anxiety for up to 20 doses 1/1/21  Yes Elizabeth Chan MD   ondansetron (Zofran ODT) 4 mg disintegrating tablet Take 2 tablets (8 mg total) by mouth every 8 (eight) hours as needed for vomiting for up to 20 doses 1/1/21  Yes June Post MD     MEDICATIONS ADMINISTERED IN LAST 24 HOURS     CURRENT MEDICATIONS     Current Facility-Administered Medications   Medication Dose Route Frequency Provider Last Rate    busPIRone  15 mg Oral BID Sharda Spicer MD      melatonin  6 mg Oral HS Sharda Spicer MD      multi-electrolyte  125 mL/hr Intravenous Continuous Sharda Spicer MD      ondansetron  4 mg Intravenous Q6H PRN Sharda Spicer MD       multi-electrolyte, 125 mL/hr      ondansetron, 4 mg, Q6H PRN        Admission Time  I spent 20 minutes admitting the patient  This involved direct patient contact where I performed a full history and physical, reviewing previous records, and reviewing laboratory and other diagnostic studies  Portions of the record may have been created with voice recognition software  Occasional wrong word or "sound a like" substitutions may have occurred due to the inherent limitations of voice recognition software    Read the chart carefully and recognize, using context, where substitutions have occurred     ==  Sharda Spicer MD  [address]  Internal Medicine Residency PGY-1

## 2021-01-03 NOTE — PROGRESS NOTES
INTERNAL MEDICINE RESIDENCY SENIOR ADMISSION NOTE     Name: Juan David Payan   Age & Sex: 35 y o  female   MRN: [id number]  Unit/Bed#: [facility] 919-01   Encounter: [id number]  Primary Care Provider: No primary care provider on file  Admit to team: SOD Team A    Code Status: Level 1 - Full Code  Admission Status: OBSERVATION  Disposition: Patient requires Med/Surg  Expected Length of Stay: <2 nights     Principal Problem:    Alcohol intoxication ([address] )  Active Problems:    Anxiety      Patient seen and examined  Reviewed H&P per Dr James Xiong   Agree with the assessment and plan with any exception/addition as noted below:  Patient is a 81-year-old female past medical history significant for alcohol abuse, anxiety  Patient presented to ED status post fall in the setting of alcohol intoxication  Reportedly patient was found at the bottom of the steps  Patient able to recall details of her fall  Patient has extensive history of alcohol abuse and status post rehab in [place]  Patient reports severe life stressors that allegedly culture to start drinking again  A time of evaluation, patient noted to be tremulous and reported palpitations  Denies hallucinations, SI, HI  Vitals notable for tachycardia in the 110s and documented respiratory rate in 20s  Blood pressure within normal limits  Room air  Laboratory evaluation notable for sodium 130 to  Normal kidney function markers  Elevated LUBNA at 244  UDS positive for THC  CT head   Patient admitted for alcohol intoxication and at risk of alcohol withdrawal   Will start CIWA protocol  Will start thiamine and foalte  Will benefit from referral to rehab        Rest of assessment and plan by Dr Suki Waters MD  Internal Medicine Residency, PGY-2  [address] [facility]

## 2021-01-03 NOTE — CASE MANAGEMENT
CM informed by Dr Gina Ascencio, pt medically stable for d/c today  Pt to be d/c and will be taking flight to [facility] today to complete ETOH rehab  Per Dr Denia Pedersen, pt already has accepting rehab facility in [facility] at this time  No other needs identified

## 2021-01-03 NOTE — UTILIZATION REVIEW
Initial Clinical Review    Admission: Date/Time/Statement:   Admission Orders (From admission, onward)     Ordered        01/02/21 1636  Place in Observation  Once                   Orders Placed This Encounter   Procedures    Place in Observation     Standing Status:   Standing     Number of Occurrences:   1     Order Specific Question:   Admitting Physician     Answer:   Jessica Kapadia [[id number]]     Order Specific Question:   Level of Care     Answer:   Med Surg [16]     ED Arrival Information     Expected Arrival Acuity Means of Arrival Escorted By Service Admission Type    - 1/2/2021 15:03 Urgent Ambulance [facility] General Medicine Urgent    Arrival Complaint    fall        Chief Complaint   Patient presents with    Fall     per ems, pt found by police at bottom of stairs unconscious, +etoh, pt a/ox4 during triage, reports depression, denies SI/HI/AH/VH     Assessment/Plan:  34 y/o female admitted observation to M/S/Tele with alcohol intoxication after presenting to ED via EMS after being found at the bottom of the steps  Pt able to recall details of fall  Pt has h/o extensive alcohol abuse and s/p rehab on [place]  Pt states severe life stressors and reason to started drinking again  At time of exam pt tremulous and reported palpitations  LUBNA 244  UDS + THC  CTH no acute findings  Start CIWA scoring for etoh w/d  IVF's  Thiamine, folate  Zofran prn  Regular diet   Psych and case management consulted to assist with possible rehab and d/c plan    CIWA scores: 7--9--5--8--3--8      ED Triage Vitals   Temperature Pulse Respirations Blood Pressure SpO2   01/02/21 1508 01/02/21 1508 01/02/21 1508 01/02/21 1508 01/02/21 1508   97 6 °F (36 4 °C) (!) 119 18 130/91 98 %      Temp Source Heart Rate Source Patient Position - Orthostatic VS BP Location FiO2 (%)   01/02/21 1508 01/02/21 1508 01/02/21 1508 01/02/21 1508 --   Oral Monitor Lying Right arm       Pain Score       01/02/21 1754       5          Wt Readings from Last 1 Encounters:   01/02/21 59 4 kg (131 lb)     Additional Vital Signs:   Date/Time  Temp  Pulse  Resp  BP  MAP (mmHg)  SpO2  O2 Device  Patient Position - Orthostatic VS   01/03/21 09:03:01  98 4 °F (36 9 °C)  85  18  134/83  100  96 %  --  --   01/03/21 05:02:23  99 5 °F (37 5 °C)  97  --  128/86  100  97 %  --  --   01/03/21 04:06:11  99 °F (37 2 °C)  105  --  141/88  106  98 %  --  --   01/03/21 00:01:57  99 8 °F (37 7 °C)  105  20  119/78  92  95 %  --  --   01/02/21 22:38:06  98 8 °F (37 1 °C)  107Abnormal   24Abnormal   124/82  96  95 %  --  --   01/02/21 19:52:20  100 °F (37 8 °C)  95  24Abnormal   122/81  95  95 %  --  Sitting   01/02/21 17:47:41  99 1 °F (37 3 °C)  104  24Abnormal   119/81  94  97 %  --  --   01/02/21 1508  97 6 °F (36 4 °C)  119Abnormal   18  130/91  --  98 %  None (Room air)  Lying       Pertinent Labs/Diagnostic Test Results:   [facility] 1/2 - No acute intracranial abnormality         Results from last 7 days   Lab Units 01/01/21  1513   WBC Thousand/uL 4 98   HEMOGLOBIN g/dL 13 0   HEMATOCRIT % 39 4   PLATELETS Thousands/uL 162   NEUTROS ABS Thousands/µL 4 14     Results from last 7 days   Lab Units 01/03/21  0549 01/01/21  1513   SODIUM mmol/L 135* 132*   POTASSIUM mmol/L 3 6 3 9   CHLORIDE mmol/L 103 98*   CO2 mmol/L 26 19*   ANION GAP mmol/L 6 15*   BUN mg/dL 7 12   CREATININE mg/dL 0 67 0 88   EGFR ml/min/1 73sq m 119 89   CALCIUM mg/dL 8 5 8 4   MAGNESIUM mg/dL  --  1 9     Results from last 7 days   Lab Units 01/03/21  0549 01/01/21  1513   AST U/L 52* 43   ALT U/L 24 25   ALK PHOS U/L 76 82   TOTAL PROTEIN g/dL 7 3 8 1   ALBUMIN g/dL 3 6 3 7   TOTAL BILIRUBIN mg/dL 0 84 0 43     Results from last 7 days   Lab Units 01/03/21  0549 01/01/21  1513   GLUCOSE RANDOM mg/dL 88 111     Results from last 7 days   Lab Units 01/01/21  1513   PH KELLY  7 430*   PCO2 KELLY mm Hg 25 2*   PO2 KELLY mm Hg 168 1*   HCO3 KELLY mmol/L 16 4*   BASE EXC KELLY mmol/L -6 2   O2 CONTENT KELLY ml/dL 18 6   O2 HGB, VENOUS % 96 8*       Results from last 7 days   Lab Units 01/02/21  1525 01/02/21  1519 01/01/21  1602   CLARITY UA   --  Clear Clear   COLOR UA  yellow Yellow Yellow   SPEC GRAV UA   --  1 010 >=1 030   PH UA   --  6 5 6 0   GLUCOSE UA mg/dl  --  Negative Negative   KETONES UA mg/dl  --  Negative Negative   BLOOD UA   --  Moderate* Large*   PROTEIN UA mg/dl  --  Negative 100 (2+)*   NITRITE UA   --  Negative Negative   BILIRUBIN UA   --  Negative Negative   UROBILINOGEN UA E U /dl  --  0 2 0 2   LEUKOCYTES UA   --  Negative Negative   WBC UA /hpf  --  None Seen 4-10*   RBC UA /hpf  --  None Seen 2-4   BACTERIA UA /hpf  --  None Seen Moderate*   EPITHELIAL CELLS WET PREP /hpf  --  None Seen Occasional     Results from last 7 days   Lab Units 01/02/21  1533   AMPH/METH  Negative   BARBITURATE UR  Negative   BENZODIAZEPINE UR  Negative   COCAINE UR  Negative   METHADONE URINE  Negative   OPIATE UR  Negative   PCP UR  Negative   THC UR  Positive*     Results from last 7 days   Lab Units 01/01/21  1513   ETHANOL LVL mg/dL 244*     ED Treatment:   Medication Administration from 01/02/2021 1503 to 01/02/2021 1736     None        Past Medical History:   Diagnosis Date    Alcohol abuse      Present on Admission:  **None**      Admitting Diagnosis: Alcohol use [Z72 89]  Fall, initial encounter [W19  XXXA]  Unspecified multiple injuries, initial encounter [T07  XXXA]  Age/Sex: 35 y o  female  Admission Orders:  Scheduled Medications:  busPIRone, 15 mg, Oral, BID  folic acid, 291 mcg, Oral, Daily  LORazepam, 2 mg, Oral, Once  melatonin, 6 mg, Oral, HS  thiamine, 100 mg, Oral, Daily    Continuous IV Infusions:  multi-electrolyte, 125 mL/hr, Intravenous, Continuous      PRN Meds:  ondansetron, 4 mg, Intravenous, Q6H PRN 1/2 x1        Network Utilization Review Department  ATTENTION: Please call with any questions or concerns to [phone] and carefully listen to the prompts so that you are directed to the right person   All voicemails are confidential   Cici Kay all requests for admission clinical reviews, approved or denied determinations and any other requests to dedicated fax number below belonging to the campus where the patient is receiving treatment   List of dedicated fax numbers for the Facilities:  [address] DENIALS (Administrative/Medical Necessity) [phone]   [address] (Maternity/NICU/Pediatrics) [phone]   [address] [place] [address] [address] [facility] [address] Lorraine Nation 9005 (  [facility]q [facility]cin[facility]ga [facility]) [address] [place] [address] [address] Spaul[address] [place] [address] [phone]

## 2021-01-06 NOTE — ED PROVIDER NOTES
History  Chief Complaint   Patient presents with    Alcohol Intoxication     Arrives via EMS for alcohol intoxication -- patient has history of alcohol abuse and reports drinking 3 handles of rum in the past 3 days  Current stressor is that her boyfriend was in a bad accident today and flown to nearby hospital  Was d/c'd from a rehab in [place] on 12/3/20      35 yr female with hx of alcoholism -- was recen tly in rehab - then started drinking again 2 weeks ago- boyfriend in serious mvc today  Which caused increasing anxiety and alcohol consumption today -- no si/-plan/ intent- no fever/cough / no hemoptysis- pos n v-- no melena/ no trauma/ syncope- sz activity       History provided by:  Patient and EMS personnel   used: No    Alcohol Intoxication  Associated symptoms: nausea and vomiting    Associated symptoms: no abdominal pain, no agitation, no confusion and no hallucinations        Prior to Admission Medications   Prescriptions Last Dose Informant Patient Reported? Taking?   busPIRone (BUSPAR) 15 mg tablet   Yes Yes   Sig: Take 15 mg by mouth 2 (two) times a day      Facility-Administered Medications: None       Past Medical History:   Diagnosis Date    Alcohol abuse        Past Surgical History:   Procedure Laterality Date    BUNIONECTOMY         History reviewed  No pertinent family history  I have reviewed and agree with the history as documented  E-Cigarette/Vaping    E-Cigarette Use Never User      E-Cigarette/Vaping Substances     Social History     Tobacco Use    Smoking status: Never Smoker    Smokeless tobacco: Never Used   Substance Use Topics    Alcohol use: Yes     Frequency: 4 or more times a week    Drug use: Yes     Types: Marijuana       Review of Systems   Constitutional: Positive for appetite change  Negative for activity change, chills, diaphoresis, fatigue, fever and unexpected weight change  HENT: Negative  Eyes: Negative  Respiratory: Negative  Cardiovascular: Negative  Gastrointestinal: Positive for nausea and vomiting  Negative for abdominal distention, abdominal pain, anal bleeding, blood in stool, constipation, diarrhea and rectal pain  Endocrine: Negative  Genitourinary: Negative  Musculoskeletal: Negative  Skin: Negative  Allergic/Immunologic: Negative  Neurological: Negative  Hematological: Negative  Psychiatric/Behavioral: Negative for agitation, behavioral problems, confusion, decreased concentration, dysphoric mood, hallucinations and self-injury  The patient is nervous/anxious  The patient is not hyperactive  Physical Exam  Physical Exam  Vitals signs and nursing note reviewed  Constitutional:       General: She is in acute distress  Appearance: Normal appearance  She is not ill-appearing, toxic-appearing or diaphoretic  Comments: avss- tachy- anxious-- pulse ox 97 % on ra- interpretation is normal- no intervention    HENT:      Head: Normocephalic and atraumatic  Comments: No scalp tenderness/contusion/ hematoma     Right Ear: Tympanic membrane, ear canal and external ear normal  There is no impacted cerumen  Left Ear: Tympanic membrane, ear canal and external ear normal  There is no impacted cerumen  Nose: Nose normal       Mouth/Throat:      Mouth: Mucous membranes are dry  Eyes:      General: No scleral icterus  Right eye: No discharge  Left eye: No discharge  Extraocular Movements: Extraocular movements intact  Conjunctiva/sclera: Conjunctivae normal       Pupils: Pupils are equal, round, and reactive to light  Comments: Mm pink   Neck:      Musculoskeletal: Normal range of motion and neck supple  No neck rigidity or muscular tenderness  Vascular: No carotid bruit  Comments: No pmt c/t/l s spine   Cardiovascular:      Rate and Rhythm: Regular rhythm  Tachycardia present  Pulses: Normal pulses  Heart sounds: Normal heart sounds  No murmur  No friction rub  No gallop  Pulmonary:      Effort: No respiratory distress  Breath sounds: Normal breath sounds  No stridor  No wheezing, rhonchi or rales  Chest:      Chest wall: No tenderness  Abdominal:      General: Bowel sounds are normal  There is no distension  Palpations: Abdomen is soft  There is no mass  Tenderness: There is no abdominal tenderness  There is no right CVA tenderness, left CVA tenderness, guarding or rebound  Hernia: No hernia is present  Comments: Soft nt/nd- no hsm/ no cva tenderness/ no peritoneal signs   Musculoskeletal: Normal range of motion  General: No swelling, tenderness, deformity or signs of injury  Right lower leg: No edema  Left lower leg: No edema  Comments: Equal bilateral radial/dp pulses- no ble edema/calf tenderness/asym/ erythem   Lymphadenopathy:      Cervical: No cervical adenopathy  Skin:     General: Skin is warm  Capillary Refill: Capillary refill takes less than 2 seconds  Coloration: Skin is not jaundiced or pale  Findings: No bruising, erythema, lesion or rash  Neurological:      General: No focal deficit present  Mental Status: She is alert and oriented to person, place, and time  Mental status is at baseline  Cranial Nerves: No cranial nerve deficit  Sensory: No sensory deficit  Motor: No weakness        Comments: No nystagmus/ ophthalmoplegia   Psychiatric:      Comments: Very anxious          Vital Signs  ED Triage Vitals   Temperature Pulse Respirations Blood Pressure SpO2   01/01/21 1550 01/01/21 1448 01/01/21 1448 01/01/21 1448 01/01/21 1448   98 8 °F (37 1 °C) (!) 135 18 125/77 99 %      Temp Source Heart Rate Source Patient Position - Orthostatic VS BP Location FiO2 (%)   01/01/21 1550 01/01/21 1448 01/01/21 1448 01/01/21 1448 --   Oral Monitor Lying Right arm       Pain Score       --                  Vitals:    01/01/21 1621 01/01/21 1715 01/01/21 1759 01/01/21 1835   BP: 133/82 133/75 123/84 141/83   Pulse: (!) 118 (!) 128 (!) 123 (!) 122   Patient Position - Orthostatic VS:             Visual Acuity      ED Medications  Medications   ondansetron (ZOFRAN) injection 4 mg (4 mg Intravenous Given 1/1/21 1531)   magnesium sulfate 2 g/50 mL IVPB (premix) 2 g (0 g Intravenous Stopped 1/1/21 1715)   diazepam (VALIUM) injection 10 mg (10 mg Intravenous Given 1/1/21 1533)   diazepam (VALIUM) injection 10 mg (10 mg Intravenous Given 1/1/21 1619)   LORazepam (ATIVAN) injection 1 mg (1 mg Intravenous Given 1/1/21 1806)       Diagnostic Studies  Results Reviewed     Procedure Component Value Units Date/Time    Rapid drug screen, urine [[id number]]  (Abnormal) Collected: 01/01/21 1602    Lab Status: Final result Specimen: Urine, Clean Catch Updated: 01/01/21 1738     Amph/Meth UR Negative     Barbiturate Ur Negative     Benzodiazepine Urine Negative     Cocaine Urine Negative     Methadone Urine Negative     Opiate Urine Negative     PCP Ur Negative     THC Urine Positive     Oxycodone Urine Negative    Narrative:      Presumptive report  If requested, specimen will be sent to reference lab for confirmation  FOR MEDICAL PURPOSES ONLY  IF CONFIRMATION NEEDED PLEASE CONTACT THE LAB WITHIN 5 DAYS      Drug Screen Cutoff Levels:  AMPHETAMINE/METHAMPHETAMINES  1000 ng/mL  BARBITURATES     200 ng/mL  BENZODIAZEPINES     200 ng/mL  COCAINE      300 ng/mL  METHADONE      300 ng/mL  OPIATES      300 ng/mL  PHENCYCLIDINE     25 ng/mL  THC       50 ng/mL  OXYCODONE      100 ng/mL    Urine Microscopic [[id number]]  (Abnormal) Collected: 01/01/21 1602    Lab Status: Final result Specimen: Urine, Clean Catch Updated: 01/01/21 1700     RBC, UA 2-4 /hpf      WBC, UA 4-10 /hpf      Epithelial Cells Occasional /hpf      Bacteria, UA Moderate /hpf     UA (URINE) with reflex to Scope [[id number]]  (Abnormal) Collected: 01/01/21 1602    Lab Status: Final result Specimen: Urine, Clean Catch Updated: 01/01/21 1645     Color, UA Yellow     Clarity, UA Clear     Specific Gravity, UA >=1 030     pH, UA 6 0     Leukocytes, UA Negative     Nitrite, UA Negative     Protein,  (2+) mg/dl      Glucose, UA Negative mg/dl      Ketones, UA Negative mg/dl      Urobilinogen, UA 0 2 E U /dl      Bilirubin, UA Negative     Blood, UA Large    Comprehensive metabolic panel [[id number]]  (Abnormal) Collected: 01/01/21 1513    Lab Status: Final result Specimen: Blood from Arm, Right Updated: 01/01/21 1638     Sodium 132 mmol/L      Potassium 3 9 mmol/L      Chloride 98 mmol/L      CO2 19 mmol/L      ANION GAP 15 mmol/L      BUN 12 mg/dL      Creatinine 0 88 mg/dL      Glucose 111 mg/dL      Calcium 8 4 mg/dL      AST 43 U/L      ALT 25 U/L      Alkaline Phosphatase 82 U/L      Total Protein 8 1 g/dL      Albumin 3 7 g/dL      Total Bilirubin 0 43 mg/dL      eGFR 89 ml/min/1 73sq m     Narrative:      [place] guidelines for Chronic Kidney Disease (CKD):     Stage 1 with normal or high GFR (GFR > 90 mL/min/1 73 square meters)    Stage 2 Mild CKD (GFR = 60-89 mL/min/1 73 square meters)    Stage 3A Moderate CKD (GFR = 45-59 mL/min/1 73 square meters)    Stage 3B Moderate CKD (GFR = 30-44 mL/min/1 73 square meters)    Stage 4 Severe CKD (GFR = 15-29 mL/min/1 73 square meters)    Stage 5 End Stage CKD (GFR <15 mL/min/1 73 square meters)  Note: GFR calculation is accurate only with a steady state creatinine    hCG, qualitative pregnancy [[id number]]  (Normal) Collected: 01/01/21 1513    Lab Status: Final result Specimen: Blood from Arm, Right Updated: 01/01/21 1638     Preg, Serum Negative    Magnesium [[id number]]  (Normal) Collected: 01/01/21 1513    Lab Status: Final result Specimen: Blood from Arm, Right Updated: 01/01/21 1638     Magnesium 1 9 mg/dL     Ethanol [[id number]]  (Abnormal) Collected: 01/01/21 1513    Lab Status: Final result Specimen: Blood from Arm, Right Updated: 01/01/21 1547     Ethanol Lvl 244 mg/dL     Blood gas, venous [[id number]]  (Abnormal) Collected: 01/01/21 1513    Lab Status: Final result Specimen: Blood from Arm, Right Updated: 01/01/21 1527     pH, [place] 7 430     pCO2, [place] 25 2 mm Hg      pO2, [place] 168 1 mm Hg      HCO3, [place] 16 4 mmol/L      Base Excess, [place] -6 2 mmol/L      O2 Content, [place] 18 6 ml/dL      O2 HGB, VENOUS 96 8 %     CBC and differential [[id number]]  (Abnormal) Collected: 01/01/21 1513    Lab Status: Final result Specimen: Blood from Arm, Right Updated: 01/01/21 1526     WBC 4 98 Thousand/uL      RBC 4 48 Million/uL      Hemoglobin 13 0 g/dL      Hematocrit 39 4 %      MCV 88 fL      MCH 29 0 pg      MCHC 33 0 g/dL      RDW 17 7 %      MPV 10 4 fL      Platelets 093 Thousands/uL      nRBC 0 /100 WBCs      Neutrophils Relative 84 %      Immat GRANS % 0 %      Lymphocytes Relative 12 %      Monocytes Relative 4 %      Eosinophils Relative 0 %      Basophils Relative 0 %      Neutrophils Absolute 4 14 Thousands/µL      Immature Grans Absolute 0 01 Thousand/uL      Lymphocytes Absolute 0 62 Thousands/µL      Monocytes Absolute 0 19 Thousand/µL      Eosinophils Absolute 0 00 Thousand/µL      Basophils Absolute 0 02 Thousands/µL                  No orders to display              Procedures  Procedures         ED Course  ED Course as of Jan 05 2143 Fri Jan 01, 2021   Guero DUONG MD NOTE-  HORACIO-AR SCORE OF 8       1551 RHODA FLYNN NOTE-  PT RE-EVALUATED- IS  TALKING ON CELL PHONE-- JUST RECENTLY GOT IV MEDS- STILL C/O  ANXIETY- WILL RE DOSE AND RE-EVALUATE- DECREASED PULSE- RR  THAN INITIAL PRESENTATION       1553 -       1559 - ER MD NOTE- PT -DENIES ANY SI-PLAN/ INTENT--- DENIES ANY ILLEGAL DRUG USAGE      [address] ER MD NOTE- [address]  12 LEAD ECG- RHYTHM STRIP REVIEWED BY ER MD - PT IN BIGEMINY BUT NO OTHER SIGN CHANGES      1653 ER MD NOTE- PT RE-EVALUATED- PT SOUND Asleep- pulse in 110's- awoke- states feels much improved with anxiety/nausea- discussed disposition with pt -- pt is scheduled to take a flight today to go to rehab in [facility]-- pt states feels comfortable going  with rx for librium/zofran -- and catching flight to [facility] for rehab- will d/c -                                 SBIRT 22yo+      Most Recent Value   SBIRT (22 yo +)   In order to provide better care to our patients, we are screening all of our patients for alcohol and drug use  Would it be okay to ask you these screening questions? No Filed at: 01/01/2021 1724                    MDM    Disposition  Final diagnoses:   Alcoholic intoxication without complication ([facility] [address] )   Alcoholism ([facility] [address] )     Time reflects when diagnosis was documented in both MDM as applicable and the Disposition within this note     Time User Action Codes Description Comment    1/1/2021  5:04 PM Vicky Fuel Add [T40 962] Alcoholic intoxication without complication ([facility] [address] )     0/4/3432  5:04 PM Vicky Fuel Add [F10 20] Alcoholism [facility])       ED Disposition     ED Disposition Condition Date/Time Comment    Discharge Stable Fri Jan 1, 2021 833 [facility] discharge to home/self care  Follow-up Information    None         Discharge Medication List as of 1/1/2021  5:09 PM      START taking these medications    Details   ondansetron (Zofran ODT) 4 mg disintegrating tablet Take 2 tablets (8 mg total) by mouth every 8 (eight) hours as needed for vomiting for up to 20 doses, Starting Fri 1/1/2021, Print      chlordiazePOXIDE (LIBRIUM) 25 mg capsule Take 1 capsule (25 mg total) by mouth 3 (three) times a day as needed for anxiety for up to 20 doses, Starting Fri 1/1/2021, Print         CONTINUE these medications which have NOT CHANGED    Details   busPIRone (BUSPAR) 15 mg tablet Take 15 mg by mouth 2 (two) times a day, Historical Med           No discharge procedures on file      PDMP Review     None          ED Provider  Electronically Signed by           Deniz Montalvo MD  01/05/21 3994